# Patient Record
Sex: FEMALE | Race: OTHER | URBAN - METROPOLITAN AREA
[De-identification: names, ages, dates, MRNs, and addresses within clinical notes are randomized per-mention and may not be internally consistent; named-entity substitution may affect disease eponyms.]

---

## 2017-08-18 ENCOUNTER — EMERGENCY (EMERGENCY)
Facility: HOSPITAL | Age: 47
LOS: 1 days | Discharge: HOME | End: 2017-08-18
Attending: EMERGENCY MEDICINE
Payer: COMMERCIAL

## 2017-08-18 VITALS
TEMPERATURE: 98 F | DIASTOLIC BLOOD PRESSURE: 72 MMHG | WEIGHT: 145.06 LBS | RESPIRATION RATE: 18 BRPM | SYSTOLIC BLOOD PRESSURE: 112 MMHG | OXYGEN SATURATION: 99 % | HEIGHT: 66 IN | HEART RATE: 77 BPM

## 2017-08-18 DIAGNOSIS — S93.402A SPRAIN OF UNSPECIFIED LIGAMENT OF LEFT ANKLE, INITIAL ENCOUNTER: ICD-10-CM
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
## 2017-08-18 PROCEDURE — 29515 APPLICATION SHORT LEG SPLINT: CPT

## 2017-08-18 PROCEDURE — 73610 X-RAY EXAM OF ANKLE: CPT | Mod: 26,LT

## 2017-08-18 PROCEDURE — 29515 APPLICATION SHORT LEG SPLINT: CPT | Mod: LT

## 2017-08-18 PROCEDURE — 73610 X-RAY EXAM OF ANKLE: CPT

## 2017-08-18 PROCEDURE — 99283 EMERGENCY DEPT VISIT LOW MDM: CPT | Mod: 25

## 2017-08-18 PROCEDURE — 99284 EMERGENCY DEPT VISIT MOD MDM: CPT | Mod: 25

## 2017-08-18 RX ORDER — IBUPROFEN 200 MG
1 TABLET ORAL
Qty: 30 | Refills: 0 | OUTPATIENT
Start: 2017-08-18 | End: 2017-08-28

## 2017-08-18 RX ORDER — IBUPROFEN 200 MG
600 TABLET ORAL ONCE
Qty: 0 | Refills: 0 | Status: COMPLETED | OUTPATIENT
Start: 2017-08-18 | End: 2017-08-18

## 2017-08-18 RX ADMIN — Medication 600 MILLIGRAM(S): at 22:19

## 2017-08-18 RX ADMIN — Medication 600 MILLIGRAM(S): at 22:49

## 2017-08-18 NOTE — ED PROVIDER NOTE - MEDICAL DECISION MAKING DETAILS
46 y/o F pt with left ankle pain and swelling s/p trauma. Will do x-ray, give pain meds and likely dc home with orthopedist f/u.

## 2017-08-18 NOTE — ED PROVIDER NOTE - OBJECTIVE STATEMENT
46 y/o F pt with no significant PMHx presents to ED c/o left ankle pain and swelling x 1 hour PTA (2030). Pt reports she was going down the stairs when she missed the last step and twisted her ankle. Since the accident, pt states she has difficulty bearing weight. Pt denies fever, chills, numbness, tingling, weakness, falling, or any other complaints. NKDA.

## 2017-08-18 NOTE — ED PROCEDURE NOTE - CPROC ED POST PROC CARE GUIDE1
Verbal/written post procedure instructions were given to patient/caregiver./Elevate the injured extremity as instructed./Keep the cast/splint/dressing clean and dry./Instructed patient/caregiver to follow-up with primary care physician./Instructed patient/caregiver regarding signs and symptoms of infection.

## 2017-08-18 NOTE — ED PROCEDURE NOTE - NS ED PERI VASCULAR NEG
no swelling/no paresthesia/capillary refill time < 2 seconds/no cyanosis of extremity/fingers/toes warm to touch

## 2017-08-29 NOTE — ED POST DISCHARGE NOTE - RESULT SUMMARY
+hairline fracture of ankle.  Results discussed.  Pt has orthopedist.  Results mailed to her home at patients request.

## 2018-01-27 ENCOUNTER — EMERGENCY (EMERGENCY)
Facility: HOSPITAL | Age: 48
LOS: 1 days | Discharge: HOME | End: 2018-01-27
Attending: EMERGENCY MEDICINE
Payer: COMMERCIAL

## 2018-01-27 VITALS
WEIGHT: 156.09 LBS | HEIGHT: 66 IN | OXYGEN SATURATION: 100 % | RESPIRATION RATE: 20 BRPM | SYSTOLIC BLOOD PRESSURE: 120 MMHG | HEART RATE: 104 BPM | TEMPERATURE: 99 F | DIASTOLIC BLOOD PRESSURE: 80 MMHG

## 2018-01-27 VITALS
DIASTOLIC BLOOD PRESSURE: 82 MMHG | OXYGEN SATURATION: 100 % | SYSTOLIC BLOOD PRESSURE: 128 MMHG | RESPIRATION RATE: 18 BRPM | TEMPERATURE: 99 F | HEART RATE: 90 BPM

## 2018-01-27 LAB
ANION GAP SERPL CALC-SCNC: 7 MMOL/L — SIGNIFICANT CHANGE UP (ref 5–17)
APPEARANCE UR: ABNORMAL
BASOPHILS # BLD AUTO: 0.1 K/UL — SIGNIFICANT CHANGE UP (ref 0–0.2)
BASOPHILS NFR BLD AUTO: 1.4 % — SIGNIFICANT CHANGE UP (ref 0–2)
BILIRUB UR-MCNC: NEGATIVE — SIGNIFICANT CHANGE UP
BUN SERPL-MCNC: 12 MG/DL — SIGNIFICANT CHANGE UP (ref 7–18)
CALCIUM SERPL-MCNC: 8.5 MG/DL — SIGNIFICANT CHANGE UP (ref 8.4–10.5)
CHLORIDE SERPL-SCNC: 104 MMOL/L — SIGNIFICANT CHANGE UP (ref 96–108)
CO2 SERPL-SCNC: 28 MMOL/L — SIGNIFICANT CHANGE UP (ref 22–31)
COLOR SPEC: YELLOW — SIGNIFICANT CHANGE UP
CREAT SERPL-MCNC: 0.79 MG/DL — SIGNIFICANT CHANGE UP (ref 0.5–1.3)
DIFF PNL FLD: ABNORMAL
EOSINOPHIL # BLD AUTO: 0 K/UL — SIGNIFICANT CHANGE UP (ref 0–0.5)
EOSINOPHIL NFR BLD AUTO: 0.7 % — SIGNIFICANT CHANGE UP (ref 0–6)
GLUCOSE SERPL-MCNC: 85 MG/DL — SIGNIFICANT CHANGE UP (ref 70–99)
GLUCOSE UR QL: NEGATIVE — SIGNIFICANT CHANGE UP
HCG UR QL: NEGATIVE — SIGNIFICANT CHANGE UP
HCT VFR BLD CALC: 40.6 % — SIGNIFICANT CHANGE UP (ref 34.5–45)
HGB BLD-MCNC: 12.8 G/DL — SIGNIFICANT CHANGE UP (ref 11.5–15.5)
KETONES UR-MCNC: ABNORMAL
LEUKOCYTE ESTERASE UR-ACNC: ABNORMAL
LYMPHOCYTES # BLD AUTO: 0.8 K/UL — LOW (ref 1–3.3)
LYMPHOCYTES # BLD AUTO: 17.9 % — SIGNIFICANT CHANGE UP (ref 13–44)
MCHC RBC-ENTMCNC: 29.5 PG — SIGNIFICANT CHANGE UP (ref 27–34)
MCHC RBC-ENTMCNC: 31.4 GM/DL — LOW (ref 32–36)
MCV RBC AUTO: 93.8 FL — SIGNIFICANT CHANGE UP (ref 80–100)
MONOCYTES # BLD AUTO: 0.8 K/UL — SIGNIFICANT CHANGE UP (ref 0–0.9)
MONOCYTES NFR BLD AUTO: 16.8 % — HIGH (ref 2–14)
NEUTROPHILS # BLD AUTO: 2.9 K/UL — SIGNIFICANT CHANGE UP (ref 1.8–7.4)
NEUTROPHILS NFR BLD AUTO: 63.2 % — SIGNIFICANT CHANGE UP (ref 43–77)
NITRITE UR-MCNC: NEGATIVE — SIGNIFICANT CHANGE UP
PH UR: 5 — SIGNIFICANT CHANGE UP (ref 5–8)
PLATELET # BLD AUTO: 205 K/UL — SIGNIFICANT CHANGE UP (ref 150–400)
POTASSIUM SERPL-MCNC: 4 MMOL/L — SIGNIFICANT CHANGE UP (ref 3.5–5.3)
POTASSIUM SERPL-SCNC: 4 MMOL/L — SIGNIFICANT CHANGE UP (ref 3.5–5.3)
PROT UR-MCNC: 30 MG/DL
RBC # BLD: 4.33 M/UL — SIGNIFICANT CHANGE UP (ref 3.8–5.2)
RBC # FLD: 12 % — SIGNIFICANT CHANGE UP (ref 10.3–14.5)
SODIUM SERPL-SCNC: 139 MMOL/L — SIGNIFICANT CHANGE UP (ref 135–145)
SP GR SPEC: 1.02 — SIGNIFICANT CHANGE UP (ref 1.01–1.02)
UROBILINOGEN FLD QL: 1
WBC # BLD: 4.6 K/UL — SIGNIFICANT CHANGE UP (ref 3.8–10.5)
WBC # FLD AUTO: 4.6 K/UL — SIGNIFICANT CHANGE UP (ref 3.8–10.5)

## 2018-01-27 PROCEDURE — 81001 URINALYSIS AUTO W/SCOPE: CPT

## 2018-01-27 PROCEDURE — 99285 EMERGENCY DEPT VISIT HI MDM: CPT

## 2018-01-27 PROCEDURE — 71046 X-RAY EXAM CHEST 2 VIEWS: CPT | Mod: 26

## 2018-01-27 PROCEDURE — 80048 BASIC METABOLIC PNL TOTAL CA: CPT

## 2018-01-27 PROCEDURE — 96374 THER/PROPH/DIAG INJ IV PUSH: CPT

## 2018-01-27 PROCEDURE — 71046 X-RAY EXAM CHEST 2 VIEWS: CPT

## 2018-01-27 PROCEDURE — 85027 COMPLETE CBC AUTOMATED: CPT

## 2018-01-27 PROCEDURE — 81025 URINE PREGNANCY TEST: CPT

## 2018-01-27 PROCEDURE — 99284 EMERGENCY DEPT VISIT MOD MDM: CPT | Mod: 25

## 2018-01-27 RX ORDER — SODIUM CHLORIDE 9 MG/ML
1000 INJECTION INTRAMUSCULAR; INTRAVENOUS; SUBCUTANEOUS ONCE
Qty: 0 | Refills: 0 | Status: COMPLETED | OUTPATIENT
Start: 2018-01-27 | End: 2018-01-27

## 2018-01-27 RX ORDER — KETOROLAC TROMETHAMINE 30 MG/ML
30 SYRINGE (ML) INJECTION ONCE
Qty: 0 | Refills: 0 | Status: DISCONTINUED | OUTPATIENT
Start: 2018-01-27 | End: 2018-01-27

## 2018-01-27 RX ADMIN — SODIUM CHLORIDE 1000 MILLILITER(S): 9 INJECTION INTRAMUSCULAR; INTRAVENOUS; SUBCUTANEOUS at 16:28

## 2018-01-27 RX ADMIN — Medication 30 MILLIGRAM(S): at 17:49

## 2018-01-27 RX ADMIN — Medication 30 MILLIGRAM(S): at 16:28

## 2018-01-27 NOTE — ED PROVIDER NOTE - OBJECTIVE STATEMENT
48 y/o F pt with no significant PMHx presents to ED c/o fever, productive cough, myalgia, and diarrhea x 4 days. Pt reports that taking Tylenol for fever with temporary relief, stating that her fever recurred a few hours after taking the medication. Pt did not receive her flu shot this season. Pt denies shortness of breath, chest pain, nausea, vomiting, or any other complaints. NKDA.

## 2018-01-27 NOTE — ED ADULT NURSE NOTE - OBJECTIVE STATEMENT
came in c/o fever chills and cough since wednesday and had a few episodes of diarrhea since then feels very weak

## 2019-08-15 PROBLEM — Z00.00 ENCOUNTER FOR PREVENTIVE HEALTH EXAMINATION: Status: ACTIVE | Noted: 2019-08-15

## 2019-08-16 ENCOUNTER — APPOINTMENT (OUTPATIENT)
Dept: OPHTHALMOLOGY | Facility: CLINIC | Age: 49
End: 2019-08-16
Payer: COMMERCIAL

## 2019-08-16 ENCOUNTER — NON-APPOINTMENT (OUTPATIENT)
Age: 49
End: 2019-08-16

## 2019-08-16 PROCEDURE — 92004 COMPRE OPH EXAM NEW PT 1/>: CPT

## 2020-01-31 NOTE — ED ADULT TRIAGE NOTE - AS TEMP SITE
no 14.8   14.94 )-----------( 270      ( 28 Apr 2019 02:48 )             45.0     04-28    141  |  101  |  10  ----------------------------<  147<H>  3.5   |  28  |  0.99    Ca    9.6      28 Apr 2019 02:48    TPro  7.9  /  Alb  4.2  /  TBili  0.2  /  DBili  x   /  AST  56<H>  /  ALT  125<H>  /  AlkPhos  84  04-28    PT/INR - ( 28 Apr 2019 02:48 )   PT: 12.5 sec;   INR: 1.14 ratio      PTT - ( 28 Apr 2019 02:48 )  PTT:36.4 sec    EKG: ST elevations II, III, aVF    All labs, imaging, and EKG personally reviewed by me. oral

## 2021-02-02 NOTE — ED ADULT NURSE NOTE - CAS DISCH BELONGINGS RETURNED
Followed by cardiology
Lab Results   Component Value Date    CREATININE 1 14 01/25/2020    CREATININE 1 23 12/14/2019    CREATININE 1 35 (H) 06/01/2019   stable on current meds, but intolerant of water pills due to gerd symptoms  Will switch to hctz  checm cmp  Lipids reviewed  Follow up after
Stable on current lipids
Yes

## 2023-05-08 ENCOUNTER — OFFICE (OUTPATIENT)
Dept: URBAN - METROPOLITAN AREA CLINIC 109 | Facility: CLINIC | Age: 53
Setting detail: OPHTHALMOLOGY
End: 2023-05-08
Payer: COMMERCIAL

## 2023-05-08 DIAGNOSIS — H10.45: ICD-10-CM

## 2023-05-08 DIAGNOSIS — H16.223: ICD-10-CM

## 2023-05-08 DIAGNOSIS — H02.015: ICD-10-CM

## 2023-05-08 PROCEDURE — 99203 OFFICE O/P NEW LOW 30 MIN: CPT | Performed by: OPHTHALMOLOGY

## 2023-05-08 ASSESSMENT — LID EXAM ASSESSMENTS: OS_TRICHIASIS: 1+

## 2023-05-08 ASSESSMENT — REFRACTION_AUTOREFRACTION
OD_AXIS: 067
OS_CYLINDER: -0.25
OS_AXIS: 067
OD_CYLINDER: -0.25
OS_SPHERE: +0.75
OD_SPHERE: +0.75

## 2023-05-08 ASSESSMENT — REFRACTION_CURRENTRX
OD_OVR_VA: 20/
OD_OVR_VA: 20/
OS_OVR_VA: 20/
OD_SPHERE: +1.75
OS_SPHERE: +1.75
OS_OVR_VA: 20/

## 2023-05-08 ASSESSMENT — KERATOMETRY
OD_AXISANGLE_DEGREES: 071
OS_K1POWER_DIOPTERS: 46.12
OD_K1POWER_DIOPTERS: 45.87
OS_K2POWER_DIOPTERS: 46.75
OD_K2POWER_DIOPTERS: 46.50
OS_AXISANGLE_DEGREES: 112

## 2023-05-08 ASSESSMENT — AXIALLENGTH_DERIVED
OS_AL: 22.3405
OD_AL: 22.42

## 2023-05-08 ASSESSMENT — VISUAL ACUITY
OD_BCVA: 20/25
OS_BCVA: 20/20-2

## 2023-05-08 ASSESSMENT — CONFRONTATIONAL VISUAL FIELD TEST (CVF)
OD_FINDINGS: FULL
OS_FINDINGS: FULL

## 2023-05-08 ASSESSMENT — SUPERFICIAL PUNCTATE KERATITIS (SPK)
OS_SPK: 1+ 2+
OD_SPK: 1+ 2+

## 2023-05-08 ASSESSMENT — SPHEQUIV_DERIVED
OD_SPHEQUIV: 0.625
OS_SPHEQUIV: 0.625

## 2023-06-18 NOTE — ED ADULT TRIAGE NOTE - ARRIVAL FROM
Home
labs wnl, ucg neg.  sonogram shows no acute pathology.  declined analgesia and reports spontaneous improvement of pain.  pt tolerating PO, feels well and can f/u with her pmd/gyn outpt.  discussed strict return parameters

## 2023-07-22 ENCOUNTER — RX ONLY (RX ONLY)
Age: 53
End: 2023-07-22

## 2023-07-22 ENCOUNTER — OFFICE (OUTPATIENT)
Facility: LOCATION | Age: 53
Setting detail: OPHTHALMOLOGY
End: 2023-07-22
Payer: COMMERCIAL

## 2023-07-22 DIAGNOSIS — H10.45: ICD-10-CM

## 2023-07-22 DIAGNOSIS — H16.223: ICD-10-CM

## 2023-07-22 PROCEDURE — 99213 OFFICE O/P EST LOW 20 MIN: CPT | Performed by: OPHTHALMOLOGY

## 2023-07-22 ASSESSMENT — REFRACTION_CURRENTRX
OD_OVR_VA: 20/
OS_OVR_VA: 20/
OS_SPHERE: +1.75
OD_SPHERE: +1.75

## 2023-07-22 ASSESSMENT — REFRACTION_AUTOREFRACTION
OD_CYLINDER: -0.25
OD_SPHERE: +0.75
OD_AXIS: 067
OS_SPHERE: +0.75
OS_AXIS: 067
OS_CYLINDER: -0.25

## 2023-07-22 ASSESSMENT — AXIALLENGTH_DERIVED
OD_AL: 22.42
OS_AL: 22.3405

## 2023-07-22 ASSESSMENT — SPHEQUIV_DERIVED
OS_SPHEQUIV: 0.625
OD_SPHEQUIV: 0.625

## 2023-07-22 ASSESSMENT — KERATOMETRY
OD_AXISANGLE_DEGREES: 071
OS_AXISANGLE_DEGREES: 112
OS_K2POWER_DIOPTERS: 46.75
OS_K1POWER_DIOPTERS: 46.12
OD_K1POWER_DIOPTERS: 45.87
OD_K2POWER_DIOPTERS: 46.50

## 2023-07-22 ASSESSMENT — SUPERFICIAL PUNCTATE KERATITIS (SPK)
OS_SPK: 1+ 2+
OD_SPK: 1+ 2+

## 2023-07-22 ASSESSMENT — TONOMETRY
OD_IOP_MMHG: 15
OS_IOP_MMHG: 15

## 2023-07-22 ASSESSMENT — VISUAL ACUITY
OS_BCVA: 20/20
OD_BCVA: 20/30

## 2023-07-22 ASSESSMENT — CONFRONTATIONAL VISUAL FIELD TEST (CVF)
OS_FINDINGS: FULL
OD_FINDINGS: FULL

## 2023-07-22 ASSESSMENT — LID EXAM ASSESSMENTS: OS_TRICHIASIS: 1+

## 2024-06-24 ENCOUNTER — APPOINTMENT (OUTPATIENT)
Dept: RHEUMATOLOGY | Facility: CLINIC | Age: 54
End: 2024-06-24

## 2025-04-03 NOTE — ED PROVIDER NOTE - CHIEF COMPLAINT
Spoke with pt.  Informed her that Dr Bonilla recommended an US in one year.  Aftr speaking with her Oncologist, she has decided to remove the gallbladder.  She is checking to see if Dr Bonilla would like another US prior to surgery.  Informed her that we will review with Dr Bonilla when he returns on 4/14/25.  Will follow up.   The patient is a 47y Female complaining of ankle pain/injury.